# Patient Record
Sex: MALE | Race: WHITE | Employment: FULL TIME | URBAN - METROPOLITAN AREA
[De-identification: names, ages, dates, MRNs, and addresses within clinical notes are randomized per-mention and may not be internally consistent; named-entity substitution may affect disease eponyms.]

---

## 2021-11-29 ENCOUNTER — OFFICE VISIT (OUTPATIENT)
Dept: FAMILY MEDICINE CLINIC | Facility: CLINIC | Age: 51
End: 2021-11-29
Payer: COMMERCIAL

## 2021-11-29 VITALS
HEART RATE: 100 BPM | HEIGHT: 68 IN | SYSTOLIC BLOOD PRESSURE: 138 MMHG | TEMPERATURE: 98 F | DIASTOLIC BLOOD PRESSURE: 90 MMHG | WEIGHT: 200 LBS | BODY MASS INDEX: 30.31 KG/M2 | OXYGEN SATURATION: 99 % | RESPIRATION RATE: 16 BRPM

## 2021-11-29 DIAGNOSIS — Z12.11 SCREENING FOR COLON CANCER: ICD-10-CM

## 2021-11-29 DIAGNOSIS — Z00.00 WELLNESS EXAMINATION: Primary | ICD-10-CM

## 2021-11-29 DIAGNOSIS — Z00.00 LABORATORY EXAM ORDERED AS PART OF ROUTINE GENERAL MEDICAL EXAMINATION: ICD-10-CM

## 2021-11-29 DIAGNOSIS — Z12.5 SCREENING FOR PROSTATE CANCER: ICD-10-CM

## 2021-11-29 DIAGNOSIS — R03.0 BORDERLINE BLOOD PRESSURE: ICD-10-CM

## 2021-11-29 PROCEDURE — 3008F BODY MASS INDEX DOCD: CPT | Performed by: FAMILY MEDICINE

## 2021-11-29 PROCEDURE — 99203 OFFICE O/P NEW LOW 30 MIN: CPT | Performed by: FAMILY MEDICINE

## 2021-11-29 PROCEDURE — 3075F SYST BP GE 130 - 139MM HG: CPT | Performed by: FAMILY MEDICINE

## 2021-11-29 PROCEDURE — 3080F DIAST BP >= 90 MM HG: CPT | Performed by: FAMILY MEDICINE

## 2021-11-29 PROCEDURE — 99386 PREV VISIT NEW AGE 40-64: CPT | Performed by: FAMILY MEDICINE

## 2021-11-29 RX ORDER — AZITHROMYCIN 250 MG/1
TABLET, FILM COATED ORAL
COMMUNITY
Start: 2021-11-18 | End: 2021-11-29 | Stop reason: ALTCHOICE

## 2021-11-29 NOTE — PATIENT INSTRUCTIONS
Go for your fasting blood tests. Limit your salt intake and fatty foods. Limit your smoking. Follow up with me will be based on your lab results.  Your blood pressure reading in the office today is borderline normal. Purchasing a home blood pressur teach you how to use it. Many people can check their own blood pressure at home without difficulty. Some need help from a family member or friend.    Your home blood pressure reading is more likely to be accurate if you do the following:  · Don't take readi healthcare appointments and check the accuracy of the monitor against the reading the providers are getting. © 7218-9612 [employer] All rights reserved. This information is not intended as a substitute for professional medical care.  Alway rice, boiled noodles, and baked or boiled potatoes. Top potatoes with chives and a little sour cream instead of butter. · Don't take antacids that are high in salt. Check the label before you buy.   Follow-up  Make a follow-up appointment with your healthc higher or the diastolic is 90 or higher. Lifestyle changes can help manage your blood pressure. These include weight loss, exercise, and quitting smoking. Have your blood pressure checked regularly to be sure it is under control.    Home care  To track you appointment. · Call your provider if you have several high readings. Don't be frightened by a single high blood pressure reading. But if you get a few high readings, check in with your provider. Follow-up care  Keep all of your follow-up appointments.  If this plan. Even if you’ve already quit, it’s easy to slip back into smoking. Your plan can help you prevent and recover from relapse. Start by setting a date to quit within a month. And then do it.     Keys to your quit plan  · Talk with your healthcare pro your loved one says he or she wants to stop smoking. Katherine last reviewed this educational content on 7/1/2019  © 6840-1051 [employer]. All rights reserved. This information is not intended as a substitute for professional medical care. colonoscopy. Screening recommendations advice vary varies among expert groups. Talk with your healthcare provider about which tests are best for you.   Some people should be screened using a different schedule because of their personal or family health hist Syphilis Men at increased risk for infection At routine exams; talk with your healthcare provider   Tuberculosis Men at increased risk for infection Talk with your healthcare provider   Vision All men in this age group Talk with your healthcare provider Tetanus/diphtheria/pertussis (Td/Tdap) booster All men in this age group Td every 10 years, or a 1-time dose of Tdap instead of a Td booster after age 25, then Td every 10 years   Recombinant zoster vaccine (RZV0) All men in this age group 2 doses; the 2

## 2021-11-29 NOTE — PROGRESS NOTES
Baldo Martinez is a 49year old male who is here for Patient presents with:   Well Adult      HPI:     This 49year old male presents as a new patient to establish care and for his annual physical.    He states [facility]-administered medications on file prior to visit.       No Known Allergies    REVIEW OF SYSTEMS:     See HPI for relevant ROS  GENERAL HEALTH: no other complaints  NEURO: no other complaints  VISION: no other complaints  RESPIRATORY: no other compla noted. Motor strength +5/5 in all 4 extremities. DTR's +2/4 in all 4 extremities. SKIN: Warm and dry. NEURO: Cr. N. II-XII intact, normal gait  PSYCH: Mood and affect normal.      ASSESSMENT AND PLAN:     1.  Wellness examination  -We discussed the follow

## 2021-12-04 ENCOUNTER — LABORATORY ENCOUNTER (OUTPATIENT)
Dept: LAB | Age: 51
End: 2021-12-04
Attending: FAMILY MEDICINE
Payer: COMMERCIAL

## 2021-12-04 DIAGNOSIS — R73.09 ELEVATED GLUCOSE: ICD-10-CM

## 2021-12-04 DIAGNOSIS — Z00.00 LABORATORY EXAM ORDERED AS PART OF ROUTINE GENERAL MEDICAL EXAMINATION: ICD-10-CM

## 2021-12-04 DIAGNOSIS — Z12.5 SCREENING FOR PROSTATE CANCER: ICD-10-CM

## 2021-12-04 PROCEDURE — 83036 HEMOGLOBIN GLYCOSYLATED A1C: CPT | Performed by: FAMILY MEDICINE

## 2021-12-04 PROCEDURE — 80061 LIPID PANEL: CPT | Performed by: FAMILY MEDICINE

## 2021-12-04 PROCEDURE — 80050 GENERAL HEALTH PANEL: CPT | Performed by: FAMILY MEDICINE

## 2021-12-04 PROCEDURE — 84153 ASSAY OF PSA TOTAL: CPT | Performed by: FAMILY MEDICINE

## 2021-12-04 PROCEDURE — 86803 HEPATITIS C AB TEST: CPT | Performed by: FAMILY MEDICINE

## 2021-12-06 DIAGNOSIS — E78.00 ELEVATED CHOLESTEROL: ICD-10-CM

## 2021-12-06 DIAGNOSIS — R73.09 ELEVATED GLUCOSE: Primary | ICD-10-CM

## 2021-12-20 ENCOUNTER — TELEPHONE (OUTPATIENT)
Dept: FAMILY MEDICINE CLINIC | Facility: CLINIC | Age: 51
End: 2021-12-20